# Patient Record
Sex: FEMALE | Race: BLACK OR AFRICAN AMERICAN | NOT HISPANIC OR LATINO | Employment: STUDENT | URBAN - METROPOLITAN AREA
[De-identification: names, ages, dates, MRNs, and addresses within clinical notes are randomized per-mention and may not be internally consistent; named-entity substitution may affect disease eponyms.]

---

## 2018-08-29 ENCOUNTER — HOSPITAL ENCOUNTER (OUTPATIENT)
Dept: MEDSURG UNIT | Facility: HOSPITAL | Age: 18
End: 2018-08-30
Attending: INTERNAL MEDICINE | Admitting: PHYSICIAN ASSISTANT

## 2018-08-29 LAB
ABS NEUT (OLG): 4.65 X10(3)/MCL
ALBUMIN SERPL-MCNC: 3.7 GM/DL (ref 3.4–5)
ALBUMIN/GLOB SERPL: 1 RATIO (ref 1–2)
ALP SERPL-CCNC: 80 UNIT/L (ref 45–117)
ALT SERPL-CCNC: 16 UNIT/L (ref 12–78)
ANISOCYTOSIS BLD QL SMEAR: NORMAL
APPEARANCE, UA: CLEAR
AST SERPL-CCNC: 13 UNIT/L (ref 15–37)
BACTERIA #/AREA URNS AUTO: ABNORMAL /[HPF]
BASOPHILS # BLD AUTO: 0.03 X10(3)/MCL
BASOPHILS NFR BLD AUTO: 0 %
BILIRUB SERPL-MCNC: 0.2 MG/DL (ref 0.2–1)
BILIRUB UR QL STRIP: NEGATIVE
BILIRUBIN DIRECT+TOT PNL SERPL-MCNC: <0.1 MG/DL
BILIRUBIN DIRECT+TOT PNL SERPL-MCNC: ABNORMAL MG/DL
BUN SERPL-MCNC: 5 MG/DL (ref 7–18)
CALCIUM SERPL-MCNC: 8.6 MG/DL (ref 8.5–10.1)
CHLORIDE SERPL-SCNC: 106 MMOL/L (ref 98–107)
CO2 SERPL-SCNC: 29 MMOL/L (ref 21–32)
COLOR UR: NORMAL
CREAT SERPL-MCNC: 0.9 MG/DL (ref 0.6–1.3)
CROSSMATCH INTERPRETATION: NORMAL
EOSINOPHIL # BLD AUTO: 0.05 X10(3)/MCL
EOSINOPHIL NFR BLD AUTO: 1 %
ERYTHROCYTE [DISTWIDTH] IN BLOOD BY AUTOMATED COUNT: 16.8 % (ref 11.5–14.5)
GLOBULIN SER-MCNC: 3.8 GM/ML (ref 2.3–3.5)
GLUCOSE (UA): NORMAL
GLUCOSE SERPL-MCNC: 88 MG/DL (ref 74–106)
HCT VFR BLD AUTO: 23.4 % (ref 35–46)
HGB BLD-MCNC: 6.6 GM/DL (ref 12–16)
HGB UR QL STRIP: NEGATIVE
HYALINE CASTS #/AREA URNS LPF: ABNORMAL /[LPF]
HYPOCHROMIA BLD QL SMEAR: NORMAL
IMM GRANULOCYTES # BLD AUTO: 0.04 10*3/UL
IMM GRANULOCYTES NFR BLD AUTO: 0 %
KETONES UR QL STRIP: NEGATIVE
LEUKOCYTE ESTERASE UR QL STRIP: NEGATIVE
LYMPHOCYTES # BLD AUTO: 2.13 X10(3)/MCL
LYMPHOCYTES NFR BLD AUTO: 27 % (ref 13–40)
MACROCYTES BLD QL SMEAR: NORMAL
MCH RBC QN AUTO: 21.8 PG (ref 26–34)
MCHC RBC AUTO-ENTMCNC: 28.2 GM/DL (ref 31–37)
MCV RBC AUTO: 77.2 FL (ref 80–100)
MONOCYTES # BLD AUTO: 1.04 X10(3)/MCL
MONOCYTES NFR BLD AUTO: 13 % (ref 4–12)
NEUTROPHILS # BLD AUTO: 4.65 X10(3)/MCL
NEUTROPHILS NFR BLD AUTO: 59 %
NITRITE UR QL STRIP: NEGATIVE
OVALOCYTES BLD QL SMEAR: NORMAL
PH UR STRIP: 8 [PH] (ref 4.5–8)
PLATELET # BLD AUTO: 337 X10(3)/MCL (ref 130–400)
PLATELET # BLD EST: ADEQUATE 10*3/UL
PMV BLD AUTO: 9.5 FL (ref 7.4–10.4)
POC BETA-HCG (QUAL): NEGATIVE
POIKILOCYTOSIS BLD QL SMEAR: NORMAL
POTASSIUM SERPL-SCNC: 3.7 MMOL/L (ref 3.5–5.1)
PRODUCT READY: NORMAL
PROT SERPL-MCNC: 7.5 GM/DL (ref 6.4–8.2)
PROT UR QL STRIP: NEGATIVE
RBC # BLD AUTO: 3.03 X10(6)/MCL (ref 4–5.2)
RBC #/AREA URNS AUTO: ABNORMAL /[HPF]
RBC MORPH BLD: NORMAL
SCHISTOCYTES BLD QL AUTO: NORMAL
SODIUM SERPL-SCNC: 139 MMOL/L (ref 136–145)
SP GR UR STRIP: 1.01 (ref 1–1.03)
SQUAMOUS #/AREA URNS LPF: ABNORMAL /[LPF]
TRANSFUSION ORDER: NORMAL
UROBILINOGEN UR STRIP-ACNC: NORMAL
WBC # SPEC AUTO: 7.9 X10(3)/MCL (ref 4.5–11)
WBC #/AREA URNS AUTO: ABNORMAL /HPF

## 2018-08-30 LAB
ABS NEUT (OLG): 2.76 X10(3)/MCL (ref 2.1–9.2)
BASOPHILS # BLD AUTO: 0.06 X10(3)/MCL
BASOPHILS NFR BLD AUTO: 1 %
BUN SERPL-MCNC: 5 MG/DL (ref 7–18)
CALCIUM SERPL-MCNC: 8.5 MG/DL (ref 8.5–10.1)
CHLORIDE SERPL-SCNC: 107 MMOL/L (ref 98–107)
CO2 SERPL-SCNC: 27 MMOL/L (ref 21–32)
CREAT SERPL-MCNC: 0.7 MG/DL (ref 0.6–1.3)
CREAT/UREA NIT SERPL: 7
EOSINOPHIL # BLD AUTO: 0.11 X10(3)/MCL
EOSINOPHIL NFR BLD AUTO: 2 %
ERYTHROCYTE [DISTWIDTH] IN BLOOD BY AUTOMATED COUNT: 17.3 % (ref 11.5–14.5)
GLUCOSE SERPL-MCNC: 88 MG/DL (ref 74–106)
HCT VFR BLD AUTO: 30.4 % (ref 35–46)
HGB BLD-MCNC: 9.1 GM/DL (ref 12–16)
IMM GRANULOCYTES # BLD AUTO: 0.01 10*3/UL
IMM GRANULOCYTES NFR BLD AUTO: 0 %
INR PPP: 1.21 (ref 0.9–1.2)
IRON SATN MFR SERPL: 3.9 % (ref 15–50)
IRON SERPL-MCNC: 18 MCG/DL (ref 50–170)
LYMPHOCYTES # BLD AUTO: 3.11 X10(3)/MCL
LYMPHOCYTES NFR BLD AUTO: 45 % (ref 13–40)
MCH RBC QN AUTO: 23.8 PG (ref 26–34)
MCHC RBC AUTO-ENTMCNC: 29.9 GM/DL (ref 31–37)
MCV RBC AUTO: 79.4 FL (ref 80–100)
MONOCYTES # BLD AUTO: 0.83 X10(3)/MCL
MONOCYTES NFR BLD AUTO: 12 % (ref 4–12)
NEUTROPHILS # BLD AUTO: 2.76 X10(3)/MCL
NEUTROPHILS NFR BLD AUTO: 40 X10(3)/MCL
PLATELET # BLD AUTO: 317 X10(3)/MCL (ref 130–400)
PMV BLD AUTO: 10.3 FL (ref 7.4–10.4)
POTASSIUM SERPL-SCNC: 4.1 MMOL/L (ref 3.5–5.1)
PROTHROMBIN TIME: 14.5 SECOND(S) (ref 11.9–14.4)
RBC # BLD AUTO: 3.83 X10(6)/MCL (ref 4–5.2)
RET# (OHS): 0.1 X10(6)/MCL (ref 0.02–0.08)
RETICULOCYTE COUNT AUTOMATED (OLG): 2.6 % (ref 0.5–1.5)
SODIUM SERPL-SCNC: 139 MMOL/L (ref 136–145)
TIBC SERPL-MCNC: 463 MCG/DL (ref 250–450)
TRANSFERRIN SERPL-MCNC: 382 MG/DL (ref 200–360)
WBC # SPEC AUTO: 6.9 X10(3)/MCL (ref 4.5–11)

## 2019-02-06 ENCOUNTER — HISTORICAL (OUTPATIENT)
Dept: ADMINISTRATIVE | Facility: HOSPITAL | Age: 19
End: 2019-02-06

## 2019-02-08 LAB — FINAL CULTURE: NORMAL

## 2021-03-11 ENCOUNTER — HISTORICAL (OUTPATIENT)
Dept: ADMINISTRATIVE | Facility: HOSPITAL | Age: 21
End: 2021-03-11

## 2021-03-11 LAB — POC BETA-HCG (QUAL): NEGATIVE

## 2021-05-29 LAB
BILIRUB SERPL-MCNC: NEGATIVE MG/DL
BLOOD URINE, POC: NEGATIVE
CLARITY, POC UA: NORMAL
COLOR, POC UA: YELLOW
GLUCOSE UR QL STRIP: NEGATIVE
KETONES UR QL STRIP: NEGATIVE
LEUKOCYTE EST, POC UA: NEGATIVE
NITRITE, POC UA: NEGATIVE
PH, POC UA: 6
POC BETA-HCG (QUAL): NEGATIVE
PROTEIN, POC: NORMAL
SPECIFIC GRAVITY, POC UA: NORMAL
UROBILINOGEN, POC UA: NORMAL

## 2021-07-05 ENCOUNTER — HISTORICAL (OUTPATIENT)
Dept: ADMINISTRATIVE | Facility: HOSPITAL | Age: 21
End: 2021-07-05

## 2021-07-05 LAB — SARS-COV-2 RNA RESP QL NAA+PROBE: NEGATIVE

## 2021-07-18 ENCOUNTER — HISTORICAL (OUTPATIENT)
Dept: ADMINISTRATIVE | Facility: HOSPITAL | Age: 21
End: 2021-07-18

## 2021-07-18 LAB — SARS-COV-2 RNA RESP QL NAA+PROBE: NOT DETECTED

## 2022-03-22 LAB
PAP RECOMMENDATION EXT: NORMAL
PAP SMEAR: NORMAL

## 2022-04-30 NOTE — H&P
Patient:   White, Ela             MRN: [id number]            FIN: [id number]               Age:   18 years     Sex:  Female     :  2000   Associated Diagnoses:   None   Author:   Stu oDherty DO      Chief Complaint   2018 14:35 CDT      Pt c/o chest tightness with Sob since Friday. Hx of endmetriosis with heavy vaginal bleeding that stopped 10 days ago. Had blood work done yesterday and was told her hemoglobin was 6.9. aaox3,nad.        History of Present Illness   18 F presents after labs at  Clinic showed severe anemia, she was referred to [facility] ED for transfusion.  She played softball the weekend and started becoming short of breath with associated chest tightness, resolved after rest.  She admits recent fatigue and headache. She has a history of asthma, tried her rescue inhaler without benefit, as well as known dysmenometorrhagia for which she is on Provera managed by Gyn. Labs in ED confirm low H&H, EKG NSR w/o ST or interval changes.      Review of Systems   Constitutional: no fevers, night sweats, chills or fatigue  HEENT: no acute vision changes  CVS: no chest pain, palpitations  Resp: + SOB, no cough or wheezing  GI: no abd pain, nausea, vomiting or diarrhea  : no dysuria, urinary incontinence  Musculoskeletal: no joint stiffness, pain, swelling or weakness  Skin: no rashes, lesions  Neuro: + headaches, seizures, numbness or syncope  Psych: no depression or anxiety          Physical Examination      Vital Signs (last 24 hrs)_____  Last Charted___________  Temp Oral     37 DegC  (AUG 29 16:)  Heart Rate Peripheral   84 bpm  (AUG 29 16:)  Resp Rate         20 br/min  (AUG 29 16:)  SBP      106 mmHg  (AUG 29 16:)  DBP      68 mmHg  (AUG 29 16:)  SpO2      100 %  (AUG 29 16:)  Weight      71 kg  (AUG 29 14:35)  Height      170 cm  (AUG 29 14:35)  BMI      24.57  (AUG 29 14:)     General: AAOx3, NAD, alert and cooperative  HEENT: PERRLA, EOMI,+  pale conjunctiva  Neck:  no LAD, no JVD, supple, no masses or thyromegaly  CVS: S1/S2 nml, RRR, no murmurs, rubs or gallops, no carotid bruits  Resp: CTA B/L, no rhonchi, rales, or wheezing  GI: Not distended, not tender, BS+, no guarding  : no CVA tenderness, normal external genitalia  Skin: not jaundiced, warm, no rashes  Musculoskeletal: normal ROM, no joint tenderness, normal muscular development  Extremities: no peripheral edema, peripheral pulses intact  Neuro: CN II-XII grossly intact, strength and sensation symmetric and intact throughout, no focal neurological deficits       Health Maintenance      Review / Management        Results review:  All Results   8/29/2018 14:55 CDT      WBC                       7.9 x10(3)/mcL                             RBC                       3.03 x10(6)/mcL  LOW                             Hgb                       6.6 gm/dL  CRIT                             Hct                       23.4 %  LOW                             Platelet                  337 x10(3)/mcL                             MCV                       77.2 fL  LOW                             MCH                       21.8 pg  LOW                             MCHC                      28.2 gm/dL  LOW                             RDW                       16.8 %  HI                             MPV                       9.5 fL                             Abs Neut                  4.65 x10(3)/mcL                             Neutro Auto               59  NA                             Lymph Auto                27 %                             Mono Auto                 13 %  HI                             Eos Auto                  1  NA                             Abs Eos                   0.05 x10(3)/mcL  NA                             Basophil Auto             0  NA                             Abs Neutro                4.65 x10(3)/mcL  NA                             Abs Lymph                 2.13 x10(3)/mcL  NA                              Abs Mono                  1.04 x10(3)/mcL  NA                             Abs Baso                  0.03 x10(3)/mcL  NA                             IG%                       0 %  NA                             IG#                       0.0400  NA                             Hypochrom                 1+                             Platelet Est              Adequate                             Anisocyte                 1+                             Poik                      2+                             Macrocyte                 1+                             RBC Morph                 Abnormal                             Schistocyte               1+                             Ovalocytes                2+                             Sodium Lvl                139 mmol/L                             Potassium Lvl             3.7 mmol/L                             Chloride                  106 mmol/L                             CO2                       29 mmol/L                             Calcium Lvl               8.6 mg/dL                             Glucose Lvl               88 mg/dL                             BUN                       5 mg/dL  LOW                             Creatinine                0.90 mg/dL                             eGFR-AA                   103 mL/min                             eGFR-AIDE                  85 mL/min  LOW                             Bili Total                0.2 mg/dL                             Bili Direct               <0.1 mg/dL                             Bili Indirect             CALCNOT VALID mg/dL                             AST                       13 unit/L  LOW                             ALT                       16 unit/L                             Alk Phos                  80 unit/L                             Total Protein             7.5 gm/dL                             Albumin Lvl               3.7 gm/dL                             Globulin                   3.80 gm/mL  HI                             A/G Ratio                 1 ratio  .       Impression and Plan   Anemia, suspect iron deficiency secondary to menstrual blood loss  History of asthma  History of endometrial    Admitted to medical unit for transfusion, PRBCs ×2 planned, obtain anemia labs, start IV and oral iron formulations. Plan for DC in AM if CBC stable.

## 2022-04-30 NOTE — ED PROVIDER NOTES
Patient:   White, Ela             MRN: [id number]            FIN: [id number]               Age:   18 years     Sex:  Female     :  2000   Associated Diagnoses:   Symptomatic anemia   Author:   Jorge Hansen MD      Basic Information   Time seen: Date 2018, Immediately upon arrival.   History source: Patient.   Arrival mode: Private vehicle.   History limitation: None.   Additional information: Chief Complaint from Nursing Triage Note : Chief Complaint   2018 14:35 CDT      Chief Complaint           Pt c/o chest tightness with Sob since Friday. Hx of endmetriosis with heavy vaginal bleeding that stopped 10 days ago. Had blood work done yesterday and was told her hemoglobin was 6.9. aaox3,nad.  .      History of Present Illness   The patient presents with abnormal lab test.  Lab test value Hemoglobin 6.9.  Associated symptoms: chest pain and shortness of breath.  Risk factors consist of none.  Prior episodes: none.  Therapy today: see nurses notes.        Review of Systems   Constitutional symptoms:  No fever, no chills, no sweats, no weakness, no fatigue, no decreased activity.    Skin symptoms:  No rash,    Respiratory symptoms:  Shortness of breath, no cough, no hemoptysis, no sputum production.    Cardiovascular symptoms:  Chest pain, no palpitations, no tachycardia, no syncope, no diaphoresis, no peripheral edema.    Gastrointestinal symptoms:  No abdominal pain, no nausea, no vomiting, no diarrhea, no constipation, no rectal bleeding.    Genitourinary symptoms:  No dysuria, no hematuria, no vaginal bleeding, no vaginal discharge.    Musculoskeletal symptoms:  No back pain, no Muscle pain, no Joint pain.    Neurologic symptoms:  No headache, no dizziness, no altered level of consciousness, no numbness, no tingling, no weakness.              Additional review of systems information: All systems reviewed as documented in chart.      Health Status   Allergies:    Allergic Reactions  (Selected)  Severity Not Documented  Adhesive Bandage- Rash.  Egg- No reactions were documented.  Iodine- No reactions were documented.,    Allergies (3) Active Reaction  Adhesive Bandage rash  Egg None Documented  iodine None Documented  .   Medications:  (Selected)   Inpatient Medications  Future  Depo-Provera 150 mg/mL intramuscular suspension: 150 mg, form: Injection, IM, Once, first dose 03/14/18 11:00:00 CDT, stop date 03/14/18 11:00:00 CDT, Months +9, Future Order, 24  Depo-Provera 150 mg/mL intramuscular suspension: 150 mg, form: Injection, IM, Once, first dose 12/14/17 11:00:00 CST, stop date 12/14/17 11:00:00 CST, Months +6, Future Order, 24  Prescriptions  Prescribed  ferrous sulfate 325 mg (65 mg elemental iron) oral delayed release tablet: 325 mg = 1 tab(s), Oral, Daily, # 90 tab(s), 1 Refill(s), Pharmacy: [facility]/pharmacy #[id number].      Past Medical/ Family/ Social History   Medical history:    Active  Endometriosis ([id number]), Reviewed as documented in chart.   Surgical history:    Dilatation and curettage ([id number]).  EXPLORE. LAP.., Reviewed as documented in chart.   Family history:    Entire family history is negative., Reviewed as documented in chart.   Problem list:    Active Problems (4)  Anemia   Endometriosis   Migraine   Tobacco user   .      Physical Examination               Vital Signs   Vital Signs   8/29/2018 14:35 CDT      Temperature Oral          36.5 DegC                             Temperature Oral (calculated)             97.70 DegF                             Peripheral Pulse Rate     80 bpm                             Respiratory Rate          18 br/min                             SpO2                      100 %                             Oxygen Therapy            Room air                             Systolic Blood Pressure   105 mmHg                             Diastolic Blood Pressure  66 mmHg  .   General:  Alert, no acute distress.    Skin:  Intact, normal for ethnicity.     Head:  Normocephalic.   Neck:  Supple.   Eye:  pale conjunctive bilateral.   Ears, nose, mouth and throat:  Oral mucosa moist.   Cardiovascular:  Regular rate and rhythm, Normal peripheral perfusion.    Respiratory:  Lungs are clear to auscultation, respirations are non-labored, breath sounds are equal.    Gastrointestinal:  Soft, Nontender, Non distended, Normal bowel sounds.    Neurological:  Alert and oriented to person, place, time, and situation, No focal neurological deficit observed.    Psychiatric:  Cooperative.      Medical Decision Making   Documents reviewed:  Emergency department nurses' notes, emergency department records, prior records.    Electrocardiogram:  Time 8/29/2018 14:42:00, rate 75, normal sinus rhythm, No ST-T changes, no ectopy, normal RI & QRS intervals, EP Interp.    Results review:  Lab results : Lab View   8/29/2018 14:55 CDT      Sodium Lvl                139 mmol/L                             Potassium Lvl             3.7 mmol/L                             Chloride                  106 mmol/L                             CO2                       29 mmol/L                             Calcium Lvl               8.6 mg/dL                             Glucose Lvl               88 mg/dL                             BUN                       5 mg/dL  LOW                             Creatinine                0.90 mg/dL                             eGFR-AA                   103 mL/min                             eGFR-AIDE                  85 mL/min  LOW                             Bili Total                0.2 mg/dL                             Bili Direct               <0.1 mg/dL                             Bili Indirect             CALCNOT VALID mg/dL                             AST                       13 unit/L  LOW                             ALT                       16 unit/L                             Alk Phos                  80 unit/L                             Total Protein              7.5 gm/dL                             Albumin Lvl               3.7 gm/dL                             Globulin                  3.80 gm/mL  HI                             A/G Ratio                 1 ratio                             WBC                       7.9 x10(3)/mcL                             RBC                       3.03 x10(6)/mcL  LOW                             Hgb                       6.6 gm/dL  CRIT                             Hct                       23.4 %  LOW                             Platelet                  337 x10(3)/mcL                             MCV                       77.2 fL  LOW                             MCH                       21.8 pg  LOW                             MCHC                      28.2 gm/dL  LOW                             RDW                       16.8 %  HI                             MPV                       9.5 fL                             Abs Neut                  4.65 x10(3)/mcL                             Neutro Auto               59  NA                             Lymph Auto                27 %                             Mono Auto                 13 %  HI                             Eos Auto                  1  NA                             Abs Eos                   0.05 x10(3)/mcL  NA                             Basophil Auto             0  NA                             Abs Neutro                4.65 x10(3)/mcL  NA                             Abs Lymph                 2.13 x10(3)/mcL  NA                             Abs Mono                  1.04 x10(3)/mcL  NA                             Abs Baso                  0.03 x10(3)/mcL  NA                             IG%                       0 %  NA                             IG#                       0.0400  NA  .   Radiology results:  Rad Results (ST)  < 12 hrs   Accession: [id number]  Order: XR Chest 2 Views  Report Dt/Tm: 08/29/2018 16:00  Report:      History:  Chest pain     Reference:  23  January 2018     Findings:  Frontal and lateral views of the chest were obtained. Heart and  mediastinum within normal limits. The lungs are clear. No pneumothorax  or significant effusion.     Impression:   No acute cardiopulmonary abnormality.      .      Reexamination/ Reevaluation   Time: 8/29/2018 15:30:00 .   Vital signs   results included from flowsheet : Vital Signs   8/29/2018 14:35 CDT      Temperature Oral          36.5 DegC                             Temperature Oral (calculated)             97.70 DegF                             Peripheral Pulse Rate     80 bpm                             Respiratory Rate          18 br/min                             SpO2                      100 %                             Oxygen Therapy            Room air                             Systolic Blood Pressure   105 mmHg                             Diastolic Blood Pressure  66 mmHg     Course: progressing as expected.   Assessment: Pt with critical H/H-transfusion ordered, consult medicine for admission..      Procedure   Critical care note   Total time: 30 minutes spent engaged in work directly related to patient care and/ or available for direct patient care.   Critical condition(s) addressed for impending deterioration include: respiratory, cardiovascular.   Associated risk factors: bleeding.   Management: bedside assessment, supervision of care, Interpretation (chest x-ray, electrocardiogram, blood pressure, cardiac output measures).   Performed by: self.      Impression and Plan   Diagnosis   Symptomatic anemia ([id number] D64.9)      Calls-Consults   -  8/29/2018 15:34:00 , Kathleen Ventura MD, Internal Medicine, recommends admission.    Plan   Condition: Stable.    Disposition: Admit time  8/29/2018 16:05:00, Place in Observation Telemetry Unit.    Counseled: Patient, Regarding diagnosis, Regarding diagnostic results, Regarding treatment plan, Patient indicated understanding of instructions.
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
## 2022-07-30 ENCOUNTER — HOSPITAL ENCOUNTER (EMERGENCY)
Facility: HOSPITAL | Age: 22
Discharge: HOME | End: 2022-07-31
Attending: EMERGENCY MEDICINE
Payer: MEDICAID

## 2022-07-30 DIAGNOSIS — F41.0 ANXIETY ATTACK: Primary | ICD-10-CM

## 2022-07-30 PROCEDURE — 81001 URINALYSIS AUTO W/SCOPE: CPT | Performed by: EMERGENCY MEDICINE

## 2022-07-30 PROCEDURE — 81025 URINE PREGNANCY TEST: CPT | Performed by: EMERGENCY MEDICINE

## 2022-07-30 PROCEDURE — 80307 DRUG TEST PRSMV CHEM ANLYZR: CPT | Performed by: EMERGENCY MEDICINE

## 2022-07-30 PROCEDURE — 99282 EMERGENCY DEPT VISIT SF MDM: CPT

## 2022-07-31 VITALS
WEIGHT: 192.88 LBS | SYSTOLIC BLOOD PRESSURE: 113 MMHG | RESPIRATION RATE: 20 BRPM | HEART RATE: 84 BPM | OXYGEN SATURATION: 98 % | TEMPERATURE: 98 F | BODY MASS INDEX: 30.27 KG/M2 | HEIGHT: 67 IN | DIASTOLIC BLOOD PRESSURE: 75 MMHG

## 2022-07-31 LAB
AMPHET UR QL SCN: NEGATIVE
APPEARANCE UR: CLEAR
B-HCG SERPL QL: NEGATIVE
BACTERIA #/AREA URNS AUTO: NORMAL /HPF
BARBITURATE SCN PRESENT UR: NEGATIVE
BENZODIAZ UR QL SCN: NEGATIVE
BILIRUB UR QL STRIP.AUTO: NEGATIVE MG/DL
CANNABINOIDS UR QL SCN: NEGATIVE
COCAINE UR QL SCN: NEGATIVE
COLOR UR AUTO: YELLOW
FENTANYL UR QL SCN: NEGATIVE
GLUCOSE UR QL STRIP.AUTO: NEGATIVE MG/DL
KETONES UR QL STRIP.AUTO: NEGATIVE MG/DL
LEUKOCYTE ESTERASE UR QL STRIP.AUTO: NEGATIVE UNIT/L
MDMA UR QL SCN: NEGATIVE
NITRITE UR QL STRIP.AUTO: NEGATIVE
OPIATES UR QL SCN: NEGATIVE
PCP UR QL: NEGATIVE
PH UR STRIP.AUTO: 5.5 [PH]
PH UR: 5.5 [PH] (ref 3–11)
PROT UR QL STRIP.AUTO: NEGATIVE MG/DL
RBC #/AREA URNS AUTO: <5 /HPF
RBC UR QL AUTO: NEGATIVE UNIT/L
SP GR UR STRIP.AUTO: 1.02 (ref 1–1.03)
SPECIFIC GRAVITY, URINE AUTO (.000) (OHS): 1.02 (ref 1–1.03)
SQUAMOUS #/AREA URNS AUTO: <5 /HPF
UROBILINOGEN UR STRIP-ACNC: 0.2 MG/DL
WBC #/AREA URNS AUTO: <5 /HPF

## 2022-07-31 NOTE — ED PROVIDER NOTES
"Encounter Date: 7/30/2022    SCRIBE #1 NOTE: I, Antoinette Fracisco, am scribing for, and in the presence of,  Jhony Dahl MD. I have scribed the following portions of the note - Other sections scribed: HPI, ROS, PE.       History     Chief Complaint   Patient presents with    Drug / Alcohol Assessment     Complaint of smoking marijuana at 2100 this evening.  Feeling bad, like " I'm going to die".         21 y/o female, with a history of asthma, presents to the ED for feelings of anxiousness beginning about 1 hour after smoking marihuana around 21:00. Pt states she feels much better now. She mentions similar feelings of anxiousness with the last time she smoked marijuana about 1 year ago. She denies any hallucinations.     The history is provided by the patient. No  was used.   Drug / Alcohol Assessment  Primary symptoms include no weakness. Primary symptoms comment: anxiety. This is a recurrent problem. The current episode started 3 to 5 hours ago. The problem has been resolved. Suspected agents include marajuana. Pertinent negatives include no fever, no nausea and no vomiting.     Review of patient's allergies indicates:   Allergen Reactions    Shellfish containing products Swelling    Egg     Adhesive Rash    Iodine Rash     No past medical history on file.  No past surgical history on file.  No family history on file.     Review of Systems   Constitutional: Negative for activity change, chills, diaphoresis, fatigue and fever.   HENT: Negative for congestion, ear pain, sinus pain and sore throat.    Eyes: Negative for visual disturbance.   Respiratory: Negative for cough, shortness of breath, wheezing and stridor.    Cardiovascular: Negative for chest pain, palpitations and leg swelling.   Gastrointestinal: Negative for abdominal pain, constipation, diarrhea, nausea, rectal pain and vomiting.   Genitourinary: Negative for dysuria and hematuria.   Musculoskeletal: Negative for " arthralgias, back pain and myalgias.   Skin: Negative for rash.   Neurological: Negative for dizziness, syncope, weakness, numbness and headaches.   Psychiatric/Behavioral: The patient is nervous/anxious.    All other systems reviewed and are negative.      Physical Exam     Initial Vitals [07/30/22 2259]   BP Pulse Resp Temp SpO2   124/79 (!) 116 20 97.7 °F (36.5 °C) 96 %      MAP       --         Physical Exam    Nursing note and vitals reviewed.  Constitutional: No distress.   HENT:   Head: Normocephalic and atraumatic.   Eyes: EOM are normal.   Neck: Trachea normal. Neck supple.   Normal range of motion.  Cardiovascular: Normal rate and regular rhythm.   No murmur heard.  Pulmonary/Chest: Breath sounds normal. No respiratory distress. She has no wheezes. She has no rhonchi. She has no rales.   Abdominal: Abdomen is soft. Bowel sounds are normal. She exhibits no distension. There is no abdominal tenderness. There is no rebound and no guarding.   Musculoskeletal:         General: Normal range of motion.      Cervical back: Normal range of motion and neck supple.      Lumbar back: Normal range of motion.     Neurological: She is alert and oriented to person, place, and time. She has normal strength.   Skin: Skin is warm and dry. No rash noted.   Psychiatric: She has a normal mood and affect. Her behavior is normal. Judgment and thought content normal.         ED Course   Procedures  Labs Reviewed   HCG QUALITATIVE URINE - Normal   URINALYSIS - Normal   URINALYSIS, MICROSCOPIC - Normal   DRUG SCREEN, URINE (BEAKER)          Imaging Results    None          Medications - No data to display  Medical Decision Making:   Clinical Tests:   Lab Tests: Ordered and Reviewed          Scribe Attestation:   Scribe #1: I performed the above scribed service and the documentation accurately describes the services I performed. I attest to the accuracy of the note.    Attending Attestation:           Physician Attestation for  Scribe:  Physician Attestation Statement for Scribe #1: I, Jhony Dahl MD, reviewed documentation, as scribed by Antoinette Yap in my presence, and it is both accurate and complete.             ED Course as of 07/31/22 0249   Sun Jul 31, 2022 0246 Patient is calm, answers all questions appropriately.  States she is feeling much better.  Will DC to home. [KG]      ED Course User Index  [KG] Jhony Dahl MD             Clinical Impression:   Final diagnoses:  [F41.0] Anxiety attack (Primary)          ED Disposition Condition    Discharge Stable        ED Prescriptions     None        Follow-up Information     Follow up With Specialties Details Why Contact Info    [facility] - Emergency Dept Emergency Medicine  If symptoms worsen [address] [facility] [id number] [phone]           Jhony Dahl MD  07/31/22 0249

## 2022-09-17 ENCOUNTER — HISTORICAL (OUTPATIENT)
Dept: ADMINISTRATIVE | Facility: HOSPITAL | Age: 22
End: 2022-09-17
Payer: MEDICAID

## 2023-06-13 ENCOUNTER — PATIENT MESSAGE (OUTPATIENT)
Dept: RESEARCH | Facility: HOSPITAL | Age: 23
End: 2023-06-13
Payer: OTHER GOVERNMENT

## 2023-11-02 ENCOUNTER — HOSPITAL ENCOUNTER (EMERGENCY)
Facility: HOSPITAL | Age: 23
Discharge: HOME | End: 2023-11-02
Attending: INTERNAL MEDICINE
Payer: MEDICAID

## 2023-11-02 VITALS
HEART RATE: 91 BPM | SYSTOLIC BLOOD PRESSURE: 123 MMHG | BODY MASS INDEX: 35.29 KG/M2 | TEMPERATURE: 98 F | WEIGHT: 224.88 LBS | OXYGEN SATURATION: 98 % | DIASTOLIC BLOOD PRESSURE: 89 MMHG | HEIGHT: 67 IN | RESPIRATION RATE: 16 BRPM

## 2023-11-02 DIAGNOSIS — R53.1 GENERALIZED WEAKNESS: Primary | ICD-10-CM

## 2023-11-02 LAB
ALBUMIN SERPL-MCNC: 3.8 G/DL (ref 3.5–5)
ALBUMIN/GLOB SERPL: 1.1 RATIO (ref 1.1–2)
ALP SERPL-CCNC: 75 UNIT/L (ref 40–150)
ALT SERPL-CCNC: 18 UNIT/L (ref 0–55)
APPEARANCE UR: ABNORMAL
AST SERPL-CCNC: 16 UNIT/L (ref 5–34)
B-HCG UR QL: NEGATIVE
BACTERIA #/AREA URNS AUTO: ABNORMAL /HPF
BASOPHILS # BLD AUTO: 0.04 X10(3)/MCL
BASOPHILS NFR BLD AUTO: 0.5 %
BILIRUB SERPL-MCNC: 0.2 MG/DL
BILIRUB UR QL STRIP.AUTO: NEGATIVE
BUN SERPL-MCNC: 8.2 MG/DL (ref 7–18.7)
CALCIUM SERPL-MCNC: 9.2 MG/DL (ref 8.4–10.2)
CHLORIDE SERPL-SCNC: 107 MMOL/L (ref 98–107)
CO2 SERPL-SCNC: 25 MMOL/L (ref 22–29)
COLOR UR AUTO: YELLOW
CREAT SERPL-MCNC: 0.85 MG/DL (ref 0.55–1.02)
CTP QC/QA: YES
EOSINOPHIL # BLD AUTO: 0.08 X10(3)/MCL (ref 0–0.9)
EOSINOPHIL NFR BLD AUTO: 1 %
ERYTHROCYTE [DISTWIDTH] IN BLOOD BY AUTOMATED COUNT: 12.1 % (ref 11.5–17)
GFR SERPLBLD CREATININE-BSD FMLA CKD-EPI: >60 MLS/MIN/1.73/M2
GLOBULIN SER-MCNC: 3.6 GM/DL (ref 2.4–3.5)
GLUCOSE SERPL-MCNC: 88 MG/DL (ref 74–100)
GLUCOSE UR QL STRIP.AUTO: NORMAL
HCT VFR BLD AUTO: 40.1 % (ref 37–47)
HGB BLD-MCNC: 13.4 G/DL (ref 12–16)
HYALINE CASTS #/AREA URNS LPF: ABNORMAL /LPF
IMM GRANULOCYTES # BLD AUTO: 0.03 X10(3)/MCL (ref 0–0.04)
IMM GRANULOCYTES NFR BLD AUTO: 0.4 %
KETONES UR QL STRIP.AUTO: NEGATIVE
LEUKOCYTE ESTERASE UR QL STRIP.AUTO: NEGATIVE
LYMPHOCYTES # BLD AUTO: 2.5 X10(3)/MCL (ref 0.6–4.6)
LYMPHOCYTES NFR BLD AUTO: 32.1 %
MCH RBC QN AUTO: 30.4 PG (ref 27–31)
MCHC RBC AUTO-ENTMCNC: 33.4 G/DL (ref 33–36)
MCV RBC AUTO: 90.9 FL (ref 80–94)
MONOCYTES # BLD AUTO: 0.64 X10(3)/MCL (ref 0.1–1.3)
MONOCYTES NFR BLD AUTO: 8.2 %
MUCOUS THREADS URNS QL MICRO: ABNORMAL /LPF
NEUTROPHILS # BLD AUTO: 4.5 X10(3)/MCL (ref 2.1–9.2)
NEUTROPHILS NFR BLD AUTO: 57.8 %
NITRITE UR QL STRIP.AUTO: NEGATIVE
NRBC BLD AUTO-RTO: 0 %
PH UR STRIP.AUTO: 5.5 [PH]
PLATELET # BLD AUTO: 316 X10(3)/MCL (ref 130–400)
PMV BLD AUTO: 9.7 FL (ref 7.4–10.4)
POTASSIUM SERPL-SCNC: 3.8 MMOL/L (ref 3.5–5.1)
PROT SERPL-MCNC: 7.4 GM/DL (ref 6.4–8.3)
PROT UR QL STRIP.AUTO: ABNORMAL
RBC # BLD AUTO: 4.41 X10(6)/MCL (ref 4.2–5.4)
RBC #/AREA URNS AUTO: ABNORMAL /HPF
RBC UR QL AUTO: ABNORMAL
SODIUM SERPL-SCNC: 141 MMOL/L (ref 136–145)
SP GR UR STRIP.AUTO: 1.03 (ref 1–1.03)
SQUAMOUS #/AREA URNS LPF: ABNORMAL /HPF
TSH SERPL-ACNC: 0.77 UIU/ML (ref 0.35–4.94)
UROBILINOGEN UR STRIP-ACNC: NORMAL
WBC # SPEC AUTO: 7.79 X10(3)/MCL (ref 4.5–11.5)
WBC #/AREA URNS AUTO: ABNORMAL /HPF

## 2023-11-02 PROCEDURE — 99283 EMERGENCY DEPT VISIT LOW MDM: CPT

## 2023-11-02 PROCEDURE — 81001 URINALYSIS AUTO W/SCOPE: CPT | Performed by: NURSE PRACTITIONER

## 2023-11-02 PROCEDURE — 85025 COMPLETE CBC W/AUTO DIFF WBC: CPT | Performed by: NURSE PRACTITIONER

## 2023-11-02 PROCEDURE — 84443 ASSAY THYROID STIM HORMONE: CPT | Performed by: INTERNAL MEDICINE

## 2023-11-02 PROCEDURE — 80053 COMPREHEN METABOLIC PANEL: CPT | Performed by: NURSE PRACTITIONER

## 2023-11-02 PROCEDURE — 81025 URINE PREGNANCY TEST: CPT | Performed by: NURSE PRACTITIONER

## 2023-11-02 NOTE — FIRST PROVIDER EVALUATION
Medical screening examination initiated.  I have conducted a focused provider triage encounter, findings are as follows:    Brief history of present illness:  Pt is a 23 y.o. female who presents with fatigue, headache. Reports frequent episodes of vaginal bleeding secondary to one of her home medications. Voicing concerns that she may be anemic.    There were no vitals filed for this visit.    Pertinent physical exam:      Brief workup plan:  Diagnostic evaluation    Preliminary workup initiated; this workup will be continued and followed by the physician or advanced practice provider that is assigned to the patient when roomed.

## 2023-11-02 NOTE — ED PROVIDER NOTES
Encounter Date: 11/2/2023       History     Chief Complaint   Patient presents with    weakness with fatigue (x)1 month     Pt states weakness with fatigue (x)1 month. Also reports history of endometriosis with bleeding that occurs every 2 weeks. Dane nadshaila     Patient is a 23-year-old female with history significant for controlled asthma, anxiety today with complaint of weakness and fatigue x1 month. Also reports history of endometriosis with bleeding that occurs every 2 weeks.  Reports heavy menstruation the last 4-6 days, 4-5 max patch changes daily. Patient follows with her gyn doctor every six-month.  History of blood transfusion x1 in 2018, received 2 units of pRBC. No PCP and desires to be established with one.  Denies shortness of breath, fever, chills, headache, presyncope, chest pain, palpitation, dysuria, urinary frequency, urgency, or retention.     The history is provided by the patient. No  was used.     Review of patient's allergies indicates:   Allergen Reactions    Shellfish containing products Swelling    Egg     Adhesive Rash    Iodine Rash     History reviewed. No pertinent past medical history.  History reviewed. No pertinent surgical history.  History reviewed. No pertinent family history.  Social History     Tobacco Use    Smoking status: Never     Passive exposure: Never    Smokeless tobacco: Never   Substance Use Topics    Drug use: Never     Review of Systems    Physical Exam     Initial Vitals [11/02/23 1527]   BP Pulse Resp Temp SpO2   122/80 94 18 98.4 °F (36.9 °C) 99 %      MAP       --         Physical Exam    Nursing note and vitals reviewed.  Constitutional: She appears well-developed and well-nourished.   HENT:   Head: Normocephalic and atraumatic.   Mouth/Throat: Oropharynx is clear and moist.   Eyes: Conjunctivae are normal. Pupils are equal, round, and reactive to light.   Neck: Neck supple. No thyromegaly present. No JVD present.   Normal range of  motion.  Cardiovascular:  Normal rate, regular rhythm, normal heart sounds and intact distal pulses.           Pulmonary/Chest: Breath sounds normal.   Abdominal: Abdomen is soft. Bowel sounds are normal. She exhibits no distension. There is no abdominal tenderness. There is no rebound and no guarding.   Musculoskeletal:         General: No edema. Normal range of motion.      Cervical back: Normal range of motion and neck supple.     Neurological: She is alert and oriented to person, place, and time. She has normal strength. GCS score is 15. GCS eye subscore is 4. GCS verbal subscore is 5. GCS motor subscore is 6.   Skin: Skin is warm and dry. No rash noted.   Psychiatric: Her behavior is normal.         ED Course   Procedures  Labs Reviewed   COMPREHENSIVE METABOLIC PANEL - Abnormal; Notable for the following components:       Result Value    Globulin 3.6 (*)     All other components within normal limits   URINALYSIS, REFLEX TO URINE CULTURE - Abnormal; Notable for the following components:    Appearance, UA Cloudy (*)     Protein, UA 1+ (*)     Blood, UA Trace (*)     WBC, UA 6-10 (*)     Bacteria, UA Trace (*)     Squamous Epithelial Cells, UA Many (*)     Mucous, UA Moderate (*)     All other components within normal limits   TSH - Normal   CBC W/ AUTO DIFFERENTIAL    Narrative:     The following orders were created for panel order CBC auto differential.  Procedure                               Abnormality         Status                     ---------                               -----------         ------                     CBC with Differential[[id number]]                            Final result                 Please view results for these tests on the individual orders.   CBC WITH DIFFERENTIAL   EXTRA TUBES    Narrative:     The following orders were created for panel order EXTRA TUBES.  Procedure                               Abnormality         Status                     ---------                                -----------         ------                     Light Blue Top Hold[[id number]]                              In process                 Gold Top Hold[[id number]]                                    In process                 Pink Top Hold[[id number]]                                    In process                   Please view results for these tests on the individual orders.   LIGHT BLUE TOP HOLD   GOLD TOP HOLD   PINK TOP HOLD   POCT URINE PREGNANCY          Imaging Results    None          Medications - No data to display  Medical Decision Making  Prior to reports to ED patient reports fatigue and dizziness.  That has not proved since admit to ED. H&H in ED unremarkable and within normal limits @ 14.4/31.4.  UA unrevealing, urine culture pending. CBC, CMP, and UPT all negative.  Vitals stable. patient clinically and hemodynamically stable for discharge.  ED precautions discussed with patient and patient verbalized understanding.  To return to ED with worsening symptoms.  Follow-up with gyn doctor in 1 week, follow-up with IM clinic in 1 week.  Questions asked and answered.    Amount and/or Complexity of Data Reviewed  Labs: ordered. Decision-making details documented in ED Course.      Additional MDM:   Differential Diagnosis:   Hypothyroidism, medication side effect, orthostatic weakness, kidney failure, stroke, among others              Attending Attestation:   Physician Attestation Statement for Resident:  As the supervising MD   Physician Attestation Statement: I have personally seen and examined this patient.   I agree with the above history.  -:   As the supervising MD I agree with the above PE.     As the supervising MD I agree with the above treatment, course, plan, and disposition.    I have reviewed and agree with the residents interpretation of the following: lab data.  I have reviewed the following: old records at this facility.                                Clinical Impression:   Final  diagnoses:  [R53.1] Generalized weakness (Primary)        ED Disposition Condition    Discharge Stable          ED Prescriptions    None       Follow-up Information       Follow up With Specialties Details Why Contact Info    [facility] - Internal Medicine Internal Medicine In 1 week If symptoms worsen [address] [facility] [address] [phone]    [facility] - Emergency Dept Emergency Medicine  If symptoms worsen [address] [facility] [address] [phone]        William Chopra MD, [facility] Resident, [facility]       Bari Chun MD  11/02/23 1709       Bari Chun MD  11/02/23 1710       Bari Chun MD  11/02/23 1711

## 2023-11-02 NOTE — Clinical Note
"Ela "Ela" Natalee was seen and treated in our emergency department on 11/2/2023.  She may return to work on 11/03/2023.       If you have any questions or concerns, please don't hesitate to call.       LPN    "

## 2023-11-28 ENCOUNTER — OFFICE VISIT (OUTPATIENT)
Dept: INTERNAL MEDICINE | Facility: CLINIC | Age: 23
End: 2023-11-28
Payer: OTHER GOVERNMENT

## 2023-11-28 VITALS
SYSTOLIC BLOOD PRESSURE: 108 MMHG | BODY MASS INDEX: 35.79 KG/M2 | DIASTOLIC BLOOD PRESSURE: 69 MMHG | RESPIRATION RATE: 16 BRPM | TEMPERATURE: 99 F | HEIGHT: 67 IN | WEIGHT: 228 LBS | HEART RATE: 82 BPM

## 2023-11-28 DIAGNOSIS — G43.909 MIGRAINE WITHOUT STATUS MIGRAINOSUS, NOT INTRACTABLE, UNSPECIFIED MIGRAINE TYPE: ICD-10-CM

## 2023-11-28 DIAGNOSIS — J45.909 ASTHMA, UNSPECIFIED ASTHMA SEVERITY, UNSPECIFIED WHETHER COMPLICATED, UNSPECIFIED WHETHER PERSISTENT: ICD-10-CM

## 2023-11-28 DIAGNOSIS — Z11.3 SCREEN FOR STD (SEXUALLY TRANSMITTED DISEASE): ICD-10-CM

## 2023-11-28 DIAGNOSIS — E04.9 THYROID ENLARGED: ICD-10-CM

## 2023-11-28 DIAGNOSIS — F17.290 OTHER TOBACCO PRODUCT NICOTINE DEPENDENCE, UNCOMPLICATED: ICD-10-CM

## 2023-11-28 DIAGNOSIS — Z00.00 WELLNESS EXAMINATION: ICD-10-CM

## 2023-11-28 PROCEDURE — 99406 BEHAV CHNG SMOKING 3-10 MIN: CPT | Mod: S$PBB,,, | Performed by: NURSE PRACTITIONER

## 2023-11-28 PROCEDURE — 99204 OFFICE O/P NEW MOD 45 MIN: CPT | Mod: S$PBB,25,, | Performed by: NURSE PRACTITIONER

## 2023-11-28 PROCEDURE — 99204 PR OFFICE/OUTPT VISIT, NEW, LEVL IV, 45-59 MIN: ICD-10-PCS | Mod: S$PBB,25,, | Performed by: NURSE PRACTITIONER

## 2023-11-28 PROCEDURE — 99215 OFFICE O/P EST HI 40 MIN: CPT | Mod: PBBFAC | Performed by: NURSE PRACTITIONER

## 2023-11-28 PROCEDURE — 99406 PR TOBACCO USE CESSATION INTERMEDIATE 3-10 MINUTES: ICD-10-PCS | Mod: S$PBB,,, | Performed by: NURSE PRACTITIONER

## 2023-11-28 RX ORDER — IBUPROFEN 800 MG/1
800 TABLET ORAL EVERY 6 HOURS PRN
COMMUNITY
Start: 2023-11-20

## 2023-11-28 RX ORDER — ELAGOLIX 150 MG/1
TABLET, FILM COATED ORAL
COMMUNITY
Start: 2023-11-20 | End: 2024-04-01

## 2023-11-28 RX ORDER — MONTELUKAST SODIUM 10 MG/1
TABLET ORAL
COMMUNITY
Start: 2022-04-18 | End: 2024-04-01 | Stop reason: SDUPTHER

## 2023-11-28 RX ORDER — AZELAIC ACID 0.15 G/G
GEL TOPICAL
COMMUNITY
Start: 2023-11-20

## 2023-11-28 RX ORDER — ALBUTEROL SULFATE 90 UG/1
AEROSOL, METERED RESPIRATORY (INHALATION)
COMMUNITY
Start: 2023-09-11 | End: 2023-12-28 | Stop reason: SDUPTHER

## 2023-11-28 RX ORDER — ACETAMINOPHEN AND CODEINE PHOSPHATE 120; 12 MG/5ML; MG/5ML
0.35 SOLUTION ORAL
COMMUNITY
Start: 2022-04-18 | End: 2024-04-01

## 2023-11-28 RX ORDER — FLUTICASONE PROPIONATE 50 MCG
1 SPRAY, SUSPENSION (ML) NASAL DAILY
Qty: 16 G | Refills: 6 | Status: SHIPPED | OUTPATIENT
Start: 2023-11-28

## 2023-11-28 NOTE — PROGRESS NOTES
Internal Medicine Clinic  TERRANCE Hansen     Patient Name: Ela Albright   : 2000  MRN:[id number]     Chief Complaint     Chief Complaint   Patient presents with    Establish Care     Hx of Asthma,DUB        History of Present Illness     23 year old AAF, presents in clinic to establish PCP. Reports SOB with waking in the AM, onset 8 days ago. Took a home COVID 19 test that was negative on 2 days ago. Reports getting sick every 2 wks. Using albuterol inhaler 2x per day since August. Reports increase migraines x 6 months. Take IBU 800mg daily prn for migraine.     PMH Asthma, anxiety, endometriosis, cystic acne, DDD neck and back pain, menorrhagia, e-cig use onset .     Patient follows with her gyn doctor every six-month.  History of blood transfusion x1 in 2018, received 2 units of pRBC. LMP 10/1/2023 through 10/5/2023. GYN Dr. Jacob Murphy every 6 months. On Orilissa since 3/2022.  Treated with hydroxyzine approximately 3 years ago for anxiety.  Dermatology Dr. Johnathon Vargas. Following ORTHO Dr. Sainz for DDD neck and back pain since . Following PT; [facility] on [address].  Previously tried marijuana, last smoked . Tried acid . Mushrooms .  Denies chest pain, shortness of breath, cough, fever, headache, dizziness, weakness, abdominal pain, nausea, vomiting, diarrhea, constipation, dysuria, depression, anxiety.     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 She is in the [employer]. Previously from [place].           Review of Systems     Review of Systems   Constitutional: Negative.    HENT: Negative.     Eyes: Negative.    Respiratory:  Positive for shortness of breath.    Cardiovascular: Negative.    Gastrointestinal: Negative.    Endocrine: Negative.    Genitourinary: Negative.    Musculoskeletal: Negative.    Integumentary:  Negative.   Allergic/Immunologic: Negative.    Neurological:  Positive for headaches.  "  Hematological: Negative.    Psychiatric/Behavioral: Negative.     All other systems reviewed and are negative.       Physical Examination     Visit Vitals  /69 (BP Location: Left arm, Patient Position: Sitting, BP Method: Large (Automatic))   Pulse 82   Temp 98.6 °F (37 °C) (Oral)   Resp 16   Ht 5' 7" (1.702 m)   Wt 103.4 kg (228 lb)   BMI 35.71 kg/m²        BP Readings from Last 6 Encounters:   11/28/23 108/69   11/02/23 123/89   07/31/22 113/75   ]    Wt Readings from Last 6 Encounters:   11/28/23 103.4 kg (228 lb)   11/02/23 102 kg (224 lb 13.9 oz)   07/30/22 87.5 kg (192 lb 14.4 oz)   ]      Physical Exam  Vitals and nursing note reviewed.   Constitutional:       Appearance: Normal appearance.   HENT:      Head: Normocephalic and atraumatic.      Right Ear: Tympanic membrane, ear canal and external ear normal.      Left Ear: Tympanic membrane, ear canal and external ear normal.      Nose: Nose normal.      Mouth/Throat:      Mouth: Mucous membranes are moist.      Pharynx: Oropharynx is clear.   Eyes:      Extraocular Movements: Extraocular movements intact.      Conjunctiva/sclera: Conjunctivae normal.      Pupils: Pupils are equal, round, and reactive to light.   Neck:      Thyroid: Thyromegaly present.   Cardiovascular:      Rate and Rhythm: Normal rate and regular rhythm.      Pulses: Normal pulses.      Heart sounds: Normal heart sounds.   Pulmonary:      Effort: Pulmonary effort is normal.      Breath sounds: Normal breath sounds.   Abdominal:      General: Abdomen is flat. Bowel sounds are normal.      Palpations: Abdomen is soft.   Musculoskeletal:         General: Normal range of motion.      Cervical back: Normal range of motion and neck supple.   Skin:     General: Skin is warm and dry.      Capillary Refill: Capillary refill takes less than 2 seconds.   Neurological:      General: No focal deficit present.      Mental Status: She is alert and oriented to person, place, and time. Mental status " "is at baseline.   Psychiatric:         Mood and Affect: Mood normal.         Behavior: Behavior normal.         Thought Content: Thought content normal.         Judgment: Judgment normal.          Labs / Imaging     Chemistry:  Lab Results   Component Value Date     11/02/2023    K 3.8 11/02/2023    CHLORIDE 107 11/02/2023    BUN 8.2 11/02/2023    CREATININE 0.85 11/02/2023    EGFRNORACEVR >60 11/02/2023    GLUCOSE 88 11/02/2023    CALCIUM 9.2 11/02/2023    ALKPHOS 75 11/02/2023    LABPROT 7.4 11/02/2023    ALBUMIN 3.8 11/02/2023    BILIDIR <0.1 04/23/2020    IBILI >0.1 04/23/2020    AST 16 11/02/2023    ALT 18 11/02/2023        No results found for: "HGBA1C", "MICROALBCREA"     Hematology:  Lab Results   Component Value Date    WBC 7.79 11/02/2023    RBC 4.41 11/02/2023    HGB 13.4 11/02/2023    HCT 40.1 11/02/2023    MCV 90.9 11/02/2023    MCH 30.4 11/02/2023    MCHC 33.4 11/02/2023    RDW 12.1 11/02/2023     11/02/2023    MPV 9.7 11/02/2023        Lipid Panel:  Lab Results   Component Value Date    CHOL 149 04/24/2020    HDL 35 (L) 04/24/2020    LDL 97.00 04/24/2020    TRIG 84 04/24/2020    TOTALCHOLEST 4 04/24/2020        Urine:  Lab Results   Component Value Date    COLORUA Yellow 11/02/2023    APPEARANCEUA Cloudy (A) 11/02/2023    SGUA 1.030 11/02/2023    PHUA 5.5 11/02/2023    PROTEINUA 1+ (A) 11/02/2023    GLUCOSEUA Normal 11/02/2023    KETONESUA Negative 11/02/2023    BLOODUA Trace (A) 11/02/2023    NITRITESUA Negative 11/02/2023    LEUKOCYTESUR Negative 11/02/2023    RBCUA 0-5 11/02/2023    WBCUA 6-10 (A) 11/02/2023    BACTERIA Trace (A) 11/02/2023    SQEPUA Many (A) 11/02/2023    HYALINECASTS None Seen 11/02/2023          Assessment       ICD-10-CM ICD-9-CM   1. Asthma, unspecified asthma severity, unspecified whether complicated, unspecified whether persistent  J45.909 493.90   2. Thyroid enlarged  E04.9 240.9   3. Generalized weakness  R53.1 780.79   4. Wellness examination  Z00.00 V70.0 "   5. Screen for STD (sexually transmitted disease)  Z11.3 V74.5   6. BMI 35.0-35.9,adult  Z68.35 V85.35        Plan     1. Asthma, unspecified asthma severity, unspecified whether complicated, unspecified whether persistent  PFT ordered  Use inhalers as prescribed (rinse mouth after use of steroid inhalers).    Use long term inhalers daily and rescue inhaler as needed.    Avoid triggers (high humidity, strong odors, chemical fumes).    Report signs of upper respiratory infection as soon as possible for early treatment.    Practice/encouraged abdominal breathing.   Eat smaller, more frequent meals.   Flu shot recommended yearly.    Smoking cessation encouraged   - Complete PFT w/ bronchodilator; Future  - Food Allergy Panel; Future    2. Thyroid enlarged    - TSH; Future  - T4, Free; Future  - US Thyroid; Future    3. Migraine without status migrainosus, not intractable, unspecified migraine type  Headache Education Provided: Stressed importance of good sleep hygiene and stress management.   Medication Overuse Education Provided: Using more than 3 OTC medications per week may worsen headaches.  Lifestyle Modifications Discussed: High intensity interval training has shown to reduce headache frequency. Low carb, high protein diet has shown to reduce headache frequency as well.  Instructed to keep headache journal.    - Ambulatory referral/consult to Internal Medicine    4. Wellness examination    - CBC Auto Differential; Future  - Comprehensive Metabolic Panel; Future  - Lipid Panel; Future  - Vitamin D; Future  - HIV 1/2 Ag/Ab (4th Gen); Future  - SYPHILIS ANTIBODY (WITH REFLEX RPR); Future  - Hepatitis Panel, Acute; Future  - Chlamydia/GC, PCR; Future  - HCG Qualitative Urine; Future    5. Screen for STD (sexually transmitted disease)    - HIV 1/2 Ag/Ab (4th Gen); Future  - SYPHILIS ANTIBODY (WITH REFLEX RPR); Future  - Hepatitis Panel, Acute; Future  - Chlamydia/GC, PCR; Future  - HCG Qualitative Urine; Future    6.  BMI 35.0-35.9,adult  Goal BMI <30.  Exercise 5 times a week for 30 minutes per day.  Avoid soda, simple sugars, excessive rice, potatoes or bread. Limit fast foods and fried foods.  Choose complex carbs in moderation (example: green vegetables, beans, oatmeal). Eat plenty of fresh fruits and vegetables with lean meats daily.  Do not skip meals. Eat a balanced portion size.  Avoid fad diets. Consider permanent healthy life style changes.       7. Other tobacco product nicotine dependence, uncomplicated  Smoking cessation advised. Instructed on smoking cessation program through [facility] and pharmacological interventions to aid in cessation.  >5 minutes allotted to educate patient on the harms of smoking, the urgency to quit, and the development of a plan for smoking cessation.         Current Outpatient Medications   Medication Instructions    albuterol (PROVENTIL/VENTOLIN HFA) 90 mcg/actuation inhaler SMARTSI-2 Puff(s) Via Inhaler Every 4-6 Hours PRN    azelaic acid (AZELEX) 15 % gel     fluticasone propionate (FLONASE) 50 mcg, Each Nostril, Daily    ibuprofen (ADVIL,MOTRIN) 800 mg, Oral, Every 6 hours PRN    montelukast (SINGULAIR) 10 mg tablet     norethindrone (STEPH) 0.35 mg, Oral    ORILISSA 150 mg Tab        Orders Placed This Encounter   Procedures    Chlamydia/GC, PCR    US Thyroid    CBC Auto Differential    Comprehensive Metabolic Panel    Lipid Panel    Vitamin D    HIV 1/2 Ag/Ab (4th Gen)    SYPHILIS ANTIBODY (WITH REFLEX RPR)    Hepatitis Panel, Acute    HCG Qualitative Urine    TSH    T4, Free    Food Allergy Panel    Complete PFT w/ bronchodilator         Future Appointments   Date Time Provider Department Center   2023  8:00 AM PFT, [facility] PULMONARY FUNCTION SERVICES [facility] [place]    2023  9:00 AM [facility] US1 [facility] [place]   2023  1:15 PM Larissa Pinzon FNP [facility] [place]        Follow up in about 2 weeks (around 2023).    Labs thoroughly reviewed with  patient. Medication refills addressed today.  RTC prn and 2 weeks, with labs 1 week prior to the apt.  COVID 19 precautions given to patient.  Patient voices understanding of all discharge instructions.      TERRANCE Hansen

## 2023-12-03 NOTE — PROGRESS NOTES
Internal Medicine Clinic  TERRANCE Hansen     Patient Name: Ela Albright   : 2000  MRN:[id number]     Chief Complaint     Chief Complaint   Patient presents with    Establish Care     Hx of Asthma,DUB        History of Present Illness     23 year old AAF, presents in clinic to establish PCP. Reports SOB with waking in the AM, onset 8 days ago. Took a home COVID 19 test that was negative on 2 days ago. Reports getting sick every 2 wks. Using albuterol inhaler 2x per day since August. Reports increase migraines x 6 months. Take IBU 800mg daily prn for migraine.      PMH Asthma, anxiety, endometriosis, cystic acne, DDD neck and back pain, menorrhagia, e-cig use onset .      Patient follows with her gyn doctor every six-month.  History of blood transfusion x1 in 2018, received 2 units of pRBC. LMP 10/1/2023 through 10/5/2023. GYN Dr. Jacob Murphy every 6 months. On Orilissa since 3/2022.  Treated with hydroxyzine approximately 3 years ago for anxiety.  Dermatology Dr. Johnathon Vargas. Following ORTHO Dr. Sainz for DDD neck and back pain since . Following PT; [facility] on [address].  Previously tried marijuana, last smoked . Tried acid . Mushrooms .  Denies chest pain, shortness of breath, cough, fever, headache, dizziness, weakness, abdominal pain, nausea, vomiting, diarrhea, constipation, dysuria, depression, anxiety.      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 She is in the [employer]. Previously from [place].             Review of Systems     Review of Systems   Constitutional: Negative.    HENT: Negative.     Eyes: Negative.    Respiratory:  Positive for shortness of breath.    Cardiovascular: Negative.    Gastrointestinal: Negative.    Endocrine: Negative.    Genitourinary: Negative.    Musculoskeletal: Negative.    Integumentary:  Negative.   Allergic/Immunologic: Negative.    Neurological:  Positive for headaches.  "  Hematological: Negative.    Psychiatric/Behavioral: Negative.     All other systems reviewed and are negative.       Physical Examination     Visit Vitals  /69 (BP Location: Left arm, Patient Position: Sitting, BP Method: Large (Automatic))   Pulse 82   Temp 98.6 °F (37 °C) (Oral)   Resp 16   Ht 5' 7" (1.702 m)   Wt 103.4 kg (228 lb)   BMI 35.71 kg/m²        BP Readings from Last 6 Encounters:   11/28/23 108/69   11/02/23 123/89   07/31/22 113/75   ]    Wt Readings from Last 6 Encounters:   11/28/23 103.4 kg (228 lb)   11/02/23 102 kg (224 lb 13.9 oz)   07/30/22 87.5 kg (192 lb 14.4 oz)   ]      Physical Exam  Vitals and nursing note reviewed.   Constitutional:       Appearance: Normal appearance.   HENT:      Head: Normocephalic and atraumatic.      Right Ear: Tympanic membrane, ear canal and external ear normal.      Left Ear: Tympanic membrane, ear canal and external ear normal.      Nose: Nose normal.      Mouth/Throat:      Mouth: Mucous membranes are moist.      Pharynx: Oropharynx is clear.   Eyes:      Extraocular Movements: Extraocular movements intact.      Conjunctiva/sclera: Conjunctivae normal.      Pupils: Pupils are equal, round, and reactive to light.   Neck:      Thyroid: Thyromegaly present.   Cardiovascular:      Rate and Rhythm: Normal rate and regular rhythm.      Pulses: Normal pulses.      Heart sounds: Normal heart sounds.   Pulmonary:      Effort: Pulmonary effort is normal.      Breath sounds: Normal breath sounds.   Abdominal:      General: Abdomen is flat. Bowel sounds are normal.      Palpations: Abdomen is soft.   Musculoskeletal:         General: Normal range of motion.      Cervical back: Normal range of motion and neck supple.   Skin:     General: Skin is warm and dry.      Capillary Refill: Capillary refill takes less than 2 seconds.   Neurological:      General: No focal deficit present.      Mental Status: She is alert and oriented to person, place, and time. Mental status " "is at baseline.   Psychiatric:         Mood and Affect: Mood normal.         Behavior: Behavior normal.         Thought Content: Thought content normal.         Judgment: Judgment normal.          Labs / Imaging     Chemistry:  Lab Results   Component Value Date     11/02/2023    K 3.8 11/02/2023    CHLORIDE 107 11/02/2023    BUN 8.2 11/02/2023    CREATININE 0.85 11/02/2023    EGFRNORACEVR >60 11/02/2023    GLUCOSE 88 11/02/2023    CALCIUM 9.2 11/02/2023    ALKPHOS 75 11/02/2023    LABPROT 7.4 11/02/2023    ALBUMIN 3.8 11/02/2023    BILIDIR <0.1 04/23/2020    IBILI >0.1 04/23/2020    AST 16 11/02/2023    ALT 18 11/02/2023        No results found for: "HGBA1C", "MICROALBCREA"     Hematology:  Lab Results   Component Value Date    WBC 7.79 11/02/2023    RBC 4.41 11/02/2023    HGB 13.4 11/02/2023    HCT 40.1 11/02/2023    MCV 90.9 11/02/2023    MCH 30.4 11/02/2023    MCHC 33.4 11/02/2023    RDW 12.1 11/02/2023     11/02/2023    MPV 9.7 11/02/2023        Lipid Panel:  Lab Results   Component Value Date    CHOL 149 04/24/2020    HDL 35 (L) 04/24/2020    LDL 97.00 04/24/2020    TRIG 84 04/24/2020    TOTALCHOLEST 4 04/24/2020        Urine:  Lab Results   Component Value Date    COLORUA Yellow 11/02/2023    APPEARANCEUA Cloudy (A) 11/02/2023    SGUA 1.030 11/02/2023    PHUA 5.5 11/02/2023    PROTEINUA 1+ (A) 11/02/2023    GLUCOSEUA Normal 11/02/2023    KETONESUA Negative 11/02/2023    BLOODUA Trace (A) 11/02/2023    NITRITESUA Negative 11/02/2023    LEUKOCYTESUR Negative 11/02/2023    RBCUA 0-5 11/02/2023    WBCUA 6-10 (A) 11/02/2023    BACTERIA Trace (A) 11/02/2023    SQEPUA Many (A) 11/02/2023    HYALINECASTS None Seen 11/02/2023          Assessment       ICD-10-CM ICD-9-CM   1. Asthma, unspecified asthma severity, unspecified whether complicated, unspecified whether persistent  J45.909 493.90   2. Thyroid enlarged  E04.9 240.9   3. Migraine without status migrainosus, not intractable, unspecified migraine " type  G43.909 346.90   4. Wellness examination  Z00.00 V70.0   5. Screen for STD (sexually transmitted disease)  Z11.3 V74.5   6. BMI 35.0-35.9,adult  Z68.35 V85.35   7. Other tobacco product nicotine dependence, uncomplicated  F17.290 305.1        Plan     1. Asthma, unspecified asthma severity, unspecified whether complicated, unspecified whether persistent  PFT ordered   Restart Singulair, claritin and flonase  Use inhalers as prescribed (rinse mouth after use of steroid inhalers).    Use long term inhalers daily and rescue inhaler as needed.    Avoid triggers (high humidity, strong odors, chemical fumes).    Report signs of upper respiratory infection as soon as possible for early treatment.    Practice/encouraged abdominal breathing.   Eat smaller, more frequent meals.   Flu shot recommended yearly.    Smoking cessation encouraged   - Complete PFT w/ bronchodilator; Future  - Food Allergy Panel; Future    2. Thyroid enlarged    - TSH; Future  - T4, Free; Future  - US Thyroid; Future    3. Migraine without status migrainosus, not intractable, unspecified migraine type  Headache Education Provided: Stressed importance of good sleep hygiene and stress management.   Medication Overuse Education Provided: Using more than 3 OTC medications per week may worsen headaches.  Lifestyle Modifications Discussed: High intensity interval training has shown to reduce headache frequency. Low carb, high protein diet has shown to reduce headache frequency as well.  Instructed to keep headache journal.    - Ambulatory referral/consult to Internal Medicine    4. Wellness examination    - CBC Auto Differential; Future  - Comprehensive Metabolic Panel; Future  - Lipid Panel; Future  - Vitamin D; Future  - HIV 1/2 Ag/Ab (4th Gen); Future  - SYPHILIS ANTIBODY (WITH REFLEX RPR); Future  - Hepatitis Panel, Acute; Future  - Chlamydia/GC, PCR; Future  - HCG Qualitative Urine; Future    5. Screen for STD (sexually transmitted disease)    -  HIV 1/2 Ag/Ab (4th Gen); Future  - SYPHILIS ANTIBODY (WITH REFLEX RPR); Future  - Hepatitis Panel, Acute; Future  - Chlamydia/GC, PCR; Future  - HCG Qualitative Urine; Future    6. BMI 35.0-35.9,adult  Goal BMI <30.  Exercise 5 times a week for 30 minutes per day.  Avoid soda, simple sugars, excessive rice, potatoes or bread. Limit fast foods and fried foods.  Choose complex carbs in moderation (example: green vegetables, beans, oatmeal). Eat plenty of fresh fruits and vegetables with lean meats daily.  Do not skip meals. Eat a balanced portion size.  Avoid fad diets. Consider permanent healthy life style changes.       7. Other tobacco product nicotine dependence, uncomplicated  Smoking cessation advised. Instructed on smoking cessation program through [facility] and pharmacological interventions to aid in cessation.  >5 minutes allotted to educate patient on the harms of smoking, the urgency to quit, and the development of a plan for smoking cessation.          Current Outpatient Medications   Medication Instructions    albuterol (PROVENTIL/VENTOLIN HFA) 90 mcg/actuation inhaler SMARTSI-2 Puff(s) Via Inhaler Every 4-6 Hours PRN    azelaic acid (AZELEX) 15 % gel     fluticasone propionate (FLONASE) 50 mcg, Each Nostril, Daily    ibuprofen (ADVIL,MOTRIN) 800 mg, Oral, Every 6 hours PRN    montelukast (SINGULAIR) 10 mg tablet     norethindrone (STEPH) 0.35 mg, Oral    ORILISSA 150 mg Tab        Orders Placed This Encounter   Procedures    Chlamydia/GC, PCR    US Thyroid    CBC Auto Differential    Comprehensive Metabolic Panel    Lipid Panel    Vitamin D    HIV 1/2 Ag/Ab (4th Gen)    SYPHILIS ANTIBODY (WITH REFLEX RPR)    Hepatitis Panel, Acute    HCG Qualitative Urine    TSH    T4, Free    Food Allergy Panel    Complete PFT w/ bronchodilator         Future Appointments   Date Time Provider Department Center   2023  8:00 AM PFT, [facility] [place]    2023  9:00 AM [facility] US  [facility] [place]   12/28/2023  1:15 PM Larissa Pinzon FNP [facility] [place]        Follow up in about 2 weeks (around 12/12/2023).    Labs thoroughly reviewed with patient. Medication refills addressed today.  RTC prn and 2 wks, with labs 1 week prior to the apt.  COVID 19 precautions given to patient.  Patient voices understanding of all discharge instructions.      TERRANCE Hansen

## 2023-12-08 ENCOUNTER — HOSPITAL ENCOUNTER (OUTPATIENT)
Dept: PULMONOLOGY | Facility: HOSPITAL | Age: 23
Discharge: HOME | End: 2023-12-08
Attending: NURSE PRACTITIONER
Payer: OTHER GOVERNMENT

## 2023-12-08 ENCOUNTER — HOSPITAL ENCOUNTER (OUTPATIENT)
Dept: RADIOLOGY | Facility: HOSPITAL | Age: 23
Discharge: HOME | End: 2023-12-08
Attending: NURSE PRACTITIONER
Payer: OTHER GOVERNMENT

## 2023-12-08 DIAGNOSIS — E04.9 THYROID ENLARGED: ICD-10-CM

## 2023-12-08 DIAGNOSIS — J45.909 ASTHMA, UNSPECIFIED ASTHMA SEVERITY, UNSPECIFIED WHETHER COMPLICATED, UNSPECIFIED WHETHER PERSISTENT: ICD-10-CM

## 2023-12-08 PROCEDURE — 94726 PLETHYSMOGRAPHY LUNG VOLUMES: CPT

## 2023-12-08 PROCEDURE — 94060 EVALUATION OF WHEEZING: CPT

## 2023-12-08 PROCEDURE — 94640 AIRWAY INHALATION TREATMENT: CPT

## 2023-12-08 PROCEDURE — 76536 US EXAM OF HEAD AND NECK: CPT | Mod: TC

## 2023-12-08 PROCEDURE — 94729 DIFFUSING CAPACITY: CPT

## 2023-12-11 LAB
DLCO SINGLE BREATH LLN: 23.99
DLCO SINGLE BREATH PRE REF: 69.6 %
DLCO SINGLE BREATH REF: 29.73
DLCOC SBVA LLN: 4.01
DLCOC SBVA REF: 5.46
DLCOC SINGLE BREATH LLN: 23.99
DLCOC SINGLE BREATH REF: 29.73
DLCOVA LLN: 4.01
DLCOVA PRE REF: 89.6 %
DLCOVA PRE: 4.89 ML/(MIN*MMHG*L) (ref 4.01–6.91)
DLCOVA REF: 5.46
ERV LLN: -16448.64
ERV PRE REF: 51.4 %
ERV REF: 1.36
FEF 25 75 CHG: 34.9 %
FEF 25 75 LLN: 2.1
FEF 25 75 POST REF: 58.4 %
FEF 25 75 PRE REF: 43.3 %
FEF 25 75 REF: 3.54
FET100 CHG: 7.3 %
FEV1 CHG: 10.5 %
FEV1 FVC CHG: 11.3 %
FEV1 FVC LLN: 75
FEV1 FVC POST REF: 87.4 %
FEV1 FVC PRE REF: 78.5 %
FEV1 FVC REF: 86
FEV1 LLN: 2.43
FEV1 POST REF: 82.7 %
FEV1 PRE REF: 74.8 %
FEV1 REF: 3.11
FRCPLETH LLN: 2.01
FRCPLETH PREREF: 86.5 %
FRCPLETH REF: 2.84
FVC CHG: -0.8 %
FVC LLN: 2.83
FVC POST REF: 94.1 %
FVC PRE REF: 94.8 %
FVC REF: 3.62
IVC PRE: 3.59 L (ref 2.83–4.44)
IVC SINGLE BREATH LLN: 2.83
IVC SINGLE BREATH PRE REF: 99.1 %
IVC SINGLE BREATH REF: 3.62
MVV LLN: 119
MVV PRE REF: 47.4 %
MVV REF: 140
PEF CHG: 6.5 %
PEF LLN: 5.22
PEF POST REF: 74.6 %
PEF PRE REF: 70.1 %
PEF REF: 7.41
POST FEF 25 75: 2.07 L/S (ref 2.1–5.23)
POST FET 100: 7.48 SEC
POST FEV1 FVC: 75.46 % (ref 75.09–95.11)
POST FEV1: 2.57 L (ref 2.43–3.76)
POST FVC: 3.4 L (ref 2.83–4.44)
POST PEF: 5.53 L/S (ref 5.22–9.61)
PRE DLCO: 20.68 ML/(MIN*MMHG) (ref 23.99–35.46)
PRE ERV: 0.7 L (ref -16448.64–16451.36)
PRE FEF 25 75: 1.53 L/S (ref 2.1–5.23)
PRE FET 100: 6.98 SEC
PRE FEV1 FVC: 67.78 % (ref 75.09–95.11)
PRE FEV1: 2.33 L (ref 2.43–3.76)
PRE FRC PL: 2.45 L (ref 2.01–3.66)
PRE FVC: 3.43 L (ref 2.83–4.44)
PRE MVV: 66.33 L/MIN (ref 118.9–160.87)
PRE PEF: 5.2 L/S (ref 5.22–9.61)
PRE RV: 1.75 L (ref 0.9–2.06)
PRE TLC: 5.4 L (ref 4.45–6.43)
RAW LLN: 3.06
RAW PRE REF: 8.1 %
RAW PRE: 0.25 CMH2O*S/L (ref 3.06–3.06)
RAW REF: 3.06
RV LLN: 0.9
RV PRE REF: 118.6 %
RV REF: 1.48
RVTLC LLN: 18
RVTLC PRE REF: 118.3 %
RVTLC PRE: 32.49 % (ref 17.87–37.05)
RVTLC REF: 27
TLC LLN: 4.45
TLC PRE REF: 99.2 %
TLC REF: 5.44
VA PRE: 4.23 L (ref 5.29–5.29)
VA SINGLE BREATH LLN: 5.29
VA SINGLE BREATH PRE REF: 79.9 %
VA SINGLE BREATH REF: 5.29
VC LLN: 2.83
VC PRE REF: 100.7 %
VC PRE: 3.65 L (ref 2.83–4.44)
VC REF: 3.62

## 2023-12-12 ENCOUNTER — TELEPHONE (OUTPATIENT)
Dept: INTERNAL MEDICINE | Facility: CLINIC | Age: 23
End: 2023-12-12
Payer: OTHER GOVERNMENT

## 2023-12-12 NOTE — TELEPHONE ENCOUNTER
----- Message from TERRANCE Hansen sent at 12/11/2023  5:41 PM CST -----  Regarding: FW:  Please let pt know there were no acute concerns noted on ultrasound of neck. Keep your apt for further discussion. Thx  ----- Message -----  From: Interface, Rad Results In  Sent: 12/8/2023   9:22 AM CST  To: TERRANCE Hansen

## 2023-12-28 ENCOUNTER — TELEPHONE (OUTPATIENT)
Dept: INTERNAL MEDICINE | Facility: CLINIC | Age: 23
End: 2023-12-28

## 2023-12-28 ENCOUNTER — OFFICE VISIT (OUTPATIENT)
Dept: INTERNAL MEDICINE | Facility: CLINIC | Age: 23
End: 2023-12-28
Payer: OTHER GOVERNMENT

## 2023-12-28 VITALS
HEIGHT: 67 IN | WEIGHT: 223 LBS | BODY MASS INDEX: 35 KG/M2 | RESPIRATION RATE: 16 BRPM | HEART RATE: 81 BPM | TEMPERATURE: 98 F | DIASTOLIC BLOOD PRESSURE: 74 MMHG | SYSTOLIC BLOOD PRESSURE: 112 MMHG

## 2023-12-28 DIAGNOSIS — E55.9 VITAMIN D DEFICIENCY: ICD-10-CM

## 2023-12-28 DIAGNOSIS — Z91.018 MULTIPLE FOOD ALLERGIES: ICD-10-CM

## 2023-12-28 DIAGNOSIS — R94.2 ABNORMAL PFTS (PULMONARY FUNCTION TESTS): ICD-10-CM

## 2023-12-28 DIAGNOSIS — J45.909 ASTHMA, UNSPECIFIED ASTHMA SEVERITY, UNSPECIFIED WHETHER COMPLICATED, UNSPECIFIED WHETHER PERSISTENT: ICD-10-CM

## 2023-12-28 PROCEDURE — 99214 OFFICE O/P EST MOD 30 MIN: CPT | Mod: PBBFAC | Performed by: NURSE PRACTITIONER

## 2023-12-28 PROCEDURE — 99214 PR OFFICE/OUTPT VISIT, EST, LEVL IV, 30-39 MIN: ICD-10-PCS | Mod: S$PBB,,, | Performed by: NURSE PRACTITIONER

## 2023-12-28 PROCEDURE — 99214 OFFICE O/P EST MOD 30 MIN: CPT | Mod: S$PBB,,, | Performed by: NURSE PRACTITIONER

## 2023-12-28 RX ORDER — AZELASTINE 1 MG/ML
1 SPRAY, METERED NASAL 2 TIMES DAILY
Qty: 30 ML | Refills: 11 | Status: SHIPPED | OUTPATIENT
Start: 2023-12-28 | End: 2024-12-27

## 2023-12-28 RX ORDER — ALBUTEROL SULFATE 90 UG/1
AEROSOL, METERED RESPIRATORY (INHALATION)
Qty: 18 G | Refills: 2 | Status: SHIPPED | OUTPATIENT
Start: 2023-12-28 | End: 2024-04-01 | Stop reason: SDUPTHER

## 2023-12-28 RX ORDER — ASPIRIN 325 MG
50000 TABLET, DELAYED RELEASE (ENTERIC COATED) ORAL
Qty: 12 CAPSULE | Refills: 0 | Status: SHIPPED | OUTPATIENT
Start: 2023-12-28

## 2023-12-28 RX ORDER — TIOTROPIUM BROMIDE INHALATION SPRAY 1.56 UG/1
2 SPRAY, METERED RESPIRATORY (INHALATION) DAILY
Qty: 4 G | Refills: 6 | Status: SHIPPED | OUTPATIENT
Start: 2023-12-28 | End: 2024-04-01 | Stop reason: SDUPTHER

## 2023-12-28 RX ORDER — ACETAMINOPHEN 500 MG
2000 TABLET ORAL DAILY
Qty: 90 CAPSULE | Refills: 1 | Status: SHIPPED | OUTPATIENT
Start: 2023-12-28 | End: 2024-04-01 | Stop reason: SDUPTHER

## 2023-12-28 RX ORDER — EPINEPHRINE 0.3 MG/.3ML
1 INJECTION SUBCUTANEOUS ONCE
Qty: 2 EACH | Refills: 0 | Status: SHIPPED | OUTPATIENT
Start: 2023-12-28 | End: 2023-12-28

## 2023-12-28 NOTE — TELEPHONE ENCOUNTER
Please inform the pt the lab called me back. Juan Diego said this panel does not break down which test the patient is positive for. They said you'll have in individually order each test.

## 2023-12-28 NOTE — PROGRESS NOTES
[facility]  [facility]     Patient Name: Ela Albright   : 2000  MRN:[id number]     Chief Complaint     Chief Complaint   Patient presents with    Follow-up     Lab review        History of Present Illness     23 year old AAF, presents in clinic for lab f/u. PFT 23 results show moderate obstruction with significant response. Normal lung volumes. Diffusion is mildly reduced but is normal per alveolar volume.  Food panel is positive. History of Asthma. Reports h/o frequent URI's.    PMH Asthma, anxiety, endometriosis, cystic acne, DDD neck and back pain, menorrhagia, e-cig use onset .      Patient follows with her gyn doctor every six-month.  History of blood transfusion x1 in 2018, received 2 units of pRBC. LMP 10/1/2023 through 10/5/2023. GYN Dr. Jacob Murphy every 6 months. On Orilissa since 3/2022.  Treated with hydroxyzine approximately 3 years ago for anxiety.  Dermatology Dr. Johnathon Vargas. Following ORTHO Dr. Sainz for DDD neck and back pain since . Following PT; [facility] on [address].  Previously tried marijuana, last smoked . Tried acid . Mushrooms .  Denies chest pain, shortness of breath, cough, fever, headache, dizziness, weakness, abdominal pain, nausea, vomiting, diarrhea, constipation, dysuria, depression, anxiety.      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 Previously from [place].                Review of Systems     Review of Systems   Constitutional: Negative.    HENT: Negative.     Eyes: Negative.    Respiratory: Negative.     Cardiovascular: Negative.    Gastrointestinal: Negative.    Endocrine: Negative.    Genitourinary: Negative.    Musculoskeletal: Negative.    Integumentary:  Negative.   Allergic/Immunologic: Negative.    Neurological: Negative.    Hematological: Negative.    Psychiatric/Behavioral: Negative.     All other systems reviewed  "and are negative.       Physical Examination     Visit Vitals  /74 (BP Location: Right arm, Patient Position: Sitting, BP Method: Large (Automatic))   Pulse 81   Temp 97.9 °F (36.6 °C) (Oral)   Resp 16   Ht 5' 7" (1.702 m)   Wt 101.2 kg (223 lb)   BMI 34.93 kg/m²        BP Readings from Last 6 Encounters:   12/28/23 112/74   11/28/23 108/69   11/02/23 123/89   07/31/22 113/75   ]    Wt Readings from Last 6 Encounters:   12/28/23 101.2 kg (223 lb)   11/28/23 103.4 kg (228 lb)   11/02/23 102 kg (224 lb 13.9 oz)   07/30/22 87.5 kg (192 lb 14.4 oz)   ]      Physical Exam  Vitals and nursing note reviewed.   Constitutional:       Appearance: Normal appearance.   HENT:      Head: Normocephalic and atraumatic.      Right Ear: Tympanic membrane, ear canal and external ear normal.      Left Ear: Tympanic membrane, ear canal and external ear normal.      Nose: Nose normal.      Mouth/Throat:      Mouth: Mucous membranes are moist.      Pharynx: Oropharynx is clear.   Eyes:      Extraocular Movements: Extraocular movements intact.      Conjunctiva/sclera: Conjunctivae normal.      Pupils: Pupils are equal, round, and reactive to light.   Cardiovascular:      Rate and Rhythm: Normal rate and regular rhythm.      Pulses: Normal pulses.      Heart sounds: Normal heart sounds.   Pulmonary:      Effort: Pulmonary effort is normal.      Breath sounds: Normal breath sounds.   Abdominal:      General: Abdomen is flat. Bowel sounds are normal.      Palpations: Abdomen is soft.   Musculoskeletal:         General: Normal range of motion.      Cervical back: Normal range of motion and neck supple.   Skin:     General: Skin is warm and dry.      Capillary Refill: Capillary refill takes less than 2 seconds.   Neurological:      General: No focal deficit present.      Mental Status: She is alert and oriented to person, place, and time. Mental status is at baseline.   Psychiatric:         Mood and Affect: Mood normal.         Behavior: " "Behavior normal.         Thought Content: Thought content normal.         Judgment: Judgment normal.        Labs / Imaging     Chemistry:  Lab Results   Component Value Date     12/08/2023    K 4.1 12/08/2023    CHLORIDE 106 12/08/2023    BUN 9.3 12/08/2023    CREATININE 0.82 12/08/2023    EGFRNORACEVR >60 12/08/2023    GLUCOSE 95 12/08/2023    CALCIUM 9.4 12/08/2023    ALKPHOS 77 12/08/2023    LABPROT 7.7 12/08/2023    ALBUMIN 3.9 12/08/2023    BILIDIR <0.1 04/23/2020    IBILI >0.1 04/23/2020    AST 13 12/08/2023    ALT 12 12/08/2023    WBKBPJBR17DI 9.8 (L) 12/08/2023        No results found for: "HGBA1C", "MICROALBCREA"     Hematology:  Lab Results   Component Value Date    WBC 9.32 12/08/2023    RBC 4.76 12/08/2023    HGB 14.0 12/08/2023    HCT 42.8 12/08/2023    MCV 89.9 12/08/2023    MCH 29.4 12/08/2023    MCHC 32.7 (L) 12/08/2023    RDW 12.1 12/08/2023     12/08/2023    MPV 9.5 12/08/2023        Lipid Panel:  Lab Results   Component Value Date    CHOL 168 12/08/2023    HDL 38 12/08/2023    .00 12/08/2023    TRIG 84 12/08/2023    TOTALCHOLEST 4 12/08/2023        Urine:  Lab Results   Component Value Date    COLORUA Yellow 11/02/2023    APPEARANCEUA Cloudy (A) 11/02/2023    SGUA 1.030 11/02/2023    PHUA 5.5 11/02/2023    PROTEINUA 1+ (A) 11/02/2023    GLUCOSEUA Normal 11/02/2023    KETONESUA Negative 11/02/2023    BLOODUA Trace (A) 11/02/2023    NITRITESUA Negative 11/02/2023    LEUKOCYTESUR Negative 11/02/2023    RBCUA 0-5 11/02/2023    WBCUA 6-10 (A) 11/02/2023    BACTERIA Trace (A) 11/02/2023    SQEPUA Many (A) 11/02/2023    HYALINECASTS None Seen 11/02/2023          Assessment       ICD-10-CM ICD-9-CM   1. Asthma, unspecified asthma severity, unspecified whether complicated, unspecified whether persistent  J45.909 493.90   2. Multiple food allergies  Z91.018 V15.05   3. Abnormal PFTs (pulmonary function tests)  R94.2 794.2   4. BMI 34.0-34.9,adult  Z68.34 V85.34   5. Vitamin D deficiency  " E55.9 268.9        Plan     1. Asthma, unspecified asthma severity, unspecified whether complicated, unspecified whether persistent  Last PFT 23  -CXR ordered   Use inhalers as prescribed (rinse mouth after use of steroid inhalers).    Use long term inhalers daily and rescue inhaler as needed.    Avoid triggers (high humidity, strong odors, chemical fumes).    Report signs of upper respiratory infection as soon as possible for early treatment.    Practice/encouraged abdominal breathing.   Eat smaller, more frequent meals.   Flu shot recommended yearly.    Smoking cessation encouraged   - albuterol (PROVENTIL/VENTOLIN HFA) 90 mcg/actuation inhaler; SMARTSI-2 Puff(s) Via Inhaler Every 4-6 Hours PRN  Dispense: 18 g; Refill: 2  - tiotropium bromide (SPIRIVA RESPIMAT) 1.25 mcg/actuation inhaler; Inhale 2 puffs into the lungs once daily. Controller  Dispense: 4 g; Refill: 6  - EPINEPHrine (AUVI-Q) 0.3 mg/0.3 mL AtIn; Inject 0.3 mLs (0.3 mg total) into the muscle once. After 5-15 minutes may repeat  dose x 1 for 1 dose  Dispense: 2 each; Refill: 0  - X-Ray Chest PA And Lateral; Future    2. Multiple food allergies    - EPINEPHrine (AUVI-Q) 0.3 mg/0.3 mL AtIn; Inject 0.3 mLs (0.3 mg total) into the muscle once. After 5-15 minutes may repeat  dose x 1 for 1 dose  Dispense: 2 each; Refill: 0    3. Abnormal PFTs (pulmonary function tests)  PFTs reviewed, will start on Spirivia, and albuterol prn  Avoid smoking, nicotine and marijuana   Regular exercise.  Add zyrtec, rotate flonase and astelin  Cont Singulair.  CXR  Pt will check with insurance and let me know where to refer for Allergy specialist  Avoid triggers    4. BMI 34.0-34.9,adult  Goal BMI <30.  Exercise 5 times a week for 30 minutes per day.  Avoid soda, simple sugars, excessive rice, potatoes or bread. Limit fast foods and fried foods.  Choose complex carbs in moderation (example: green vegetables, beans, oatmeal). Eat plenty of fresh fruits and vegetables  with lean meats daily.  Do not skip meals. Eat a balanced portion size.  Avoid fad diets. Consider permanent healthy life style changes.       5. Vitamin D deficiency  Vitamin D level is low. Will start patient on a prescription of vitamin D3 99839 IU once a week for 12 weeks. Once this prescription is completed, patient advised to purchase OTC vitamin D3 2000 IU and take this once a day thereafter or unless notified otherwise. Repeat Vitamin D level at follow up. Last PFT- /Last CXR-   Use inhalers as prescribed (rinse mouth after use of steroid inhalers).    Use long term inhalers daily and rescue inhaler as needed.    Avoid triggers (high humidity, strong odors, chemical fumes).    Report signs of upper respiratory infection as soon as possible for early treatment.    Practice/encouraged abdominal breathing.   Eat smaller, more frequent meals.   Flu shot recommended yearly.    Smoking cessation encouraged   - cholecalciferol, vitamin D3, 1,250 mcg (50,000 unit) capsule; Take 1 capsule (50,000 Units total) by mouth every 7 days.  Dispense: 12 capsule; Refill: 0      Juan Diego said this panel does not break down which test the patient is positive for. They said you'll have in individually order each test.     Current Outpatient Medications   Medication Instructions    albuterol (PROVENTIL/VENTOLIN HFA) 90 mcg/actuation inhaler SMARTSI-2 Puff(s) Via Inhaler Every 4-6 Hours PRN    azelaic acid (AZELEX) 15 % gel     azelastine (ASTELIN) 137 mcg, Nasal, 2 times daily    cholecalciferol (vitamin D3) (VITAMIN D3) 2,000 Units, Oral, Daily    cholecalciferol (vitamin D3) 50,000 Units, Oral, Every 7 days    EPINEPHrine (AUVI-Q) 0.3 mg, Intramuscular, Once, After 5-15 minutes may repeat  dose x 1    fluticasone propionate (FLONASE) 50 mcg, Each Nostril, Daily    ibuprofen (ADVIL,MOTRIN) 800 mg, Oral, Every 6 hours PRN    montelukast (SINGULAIR) 10 mg tablet     norethindrone (STEPH) 0.35 mg, Oral    ORILISSA 150 mg Tab      tiotropium bromide (SPIRIVA RESPIMAT) 1.25 mcg/actuation inhaler 2 puffs, Inhalation, Daily, Controller       Orders Placed This Encounter   Procedures    X-Ray Chest PA And Lateral         Future Appointments   Date Time Provider Department Center   3/28/2024  1:40 PM [facility]        Follow up in about 3 months (around 3/28/2024) for Virtual Visit.    Labs thoroughly reviewed with patient. Medication refills addressed today.  RTC prn and 3-4 months, with labs 1 week prior to the apt.  COVID 19 precautions given to patient.  Patient voices understanding of all discharge instructions.      TER[facility] Hansen

## 2024-01-02 ENCOUNTER — DOCUMENTATION ONLY (OUTPATIENT)
Dept: ADMINISTRATIVE | Facility: HOSPITAL | Age: 24
End: 2024-01-02
Payer: OTHER GOVERNMENT

## 2024-04-01 ENCOUNTER — OFFICE VISIT (OUTPATIENT)
Dept: INTERNAL MEDICINE | Facility: CLINIC | Age: 24
End: 2024-04-01
Payer: OTHER GOVERNMENT

## 2024-04-01 VITALS
BODY MASS INDEX: 20.93 KG/M2 | DIASTOLIC BLOOD PRESSURE: 76 MMHG | HEART RATE: 64 BPM | WEIGHT: 133.38 LBS | RESPIRATION RATE: 20 BRPM | TEMPERATURE: 98 F | SYSTOLIC BLOOD PRESSURE: 107 MMHG | OXYGEN SATURATION: 97 % | HEIGHT: 67 IN

## 2024-04-01 DIAGNOSIS — J45.909 ASTHMA, UNSPECIFIED ASTHMA SEVERITY, UNSPECIFIED WHETHER COMPLICATED, UNSPECIFIED WHETHER PERSISTENT: ICD-10-CM

## 2024-04-01 DIAGNOSIS — E55.9 VITAMIN D DEFICIENCY: ICD-10-CM

## 2024-04-01 DIAGNOSIS — Z11.3 SCREEN FOR STD (SEXUALLY TRANSMITTED DISEASE): ICD-10-CM

## 2024-04-01 DIAGNOSIS — Z00.00 WELLNESS EXAMINATION: ICD-10-CM

## 2024-04-01 PROCEDURE — 99214 OFFICE O/P EST MOD 30 MIN: CPT | Mod: S$PBB,,, | Performed by: NURSE PRACTITIONER

## 2024-04-01 PROCEDURE — 99214 OFFICE O/P EST MOD 30 MIN: CPT | Mod: PBBFAC | Performed by: NURSE PRACTITIONER

## 2024-04-01 RX ORDER — ACETAMINOPHEN 500 MG
2000 TABLET ORAL DAILY
Qty: 90 CAPSULE | Refills: 3 | Status: SHIPPED | OUTPATIENT
Start: 2024-04-01

## 2024-04-01 RX ORDER — ALBUTEROL SULFATE 90 UG/1
AEROSOL, METERED RESPIRATORY (INHALATION)
Qty: 18 G | Refills: 2 | Status: SHIPPED | OUTPATIENT
Start: 2024-04-01

## 2024-04-01 RX ORDER — TIOTROPIUM BROMIDE INHALATION SPRAY 1.56 UG/1
2 SPRAY, METERED RESPIRATORY (INHALATION) DAILY
Qty: 4 G | Refills: 6 | Status: SHIPPED | OUTPATIENT
Start: 2024-04-01

## 2024-04-01 RX ORDER — CLINDAMYCIN PHOSPHATE 10 MG/ML
1 SOLUTION TOPICAL
COMMUNITY
Start: 2024-03-02

## 2024-04-01 RX ORDER — MONTELUKAST SODIUM 10 MG/1
TABLET ORAL
Qty: 90 TABLET | Refills: 2 | Status: SHIPPED | OUTPATIENT
Start: 2024-04-01
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
## 2024-08-17 ENCOUNTER — HOSPITAL ENCOUNTER (EMERGENCY)
Facility: HOSPITAL | Age: 24
Discharge: HOME | End: 2024-08-18
Attending: INTERNAL MEDICINE
Payer: COMMERCIAL

## 2024-08-17 DIAGNOSIS — S16.1XXA CERVICAL STRAIN, ACUTE, INITIAL ENCOUNTER: ICD-10-CM
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
## 2024-08-17 PROCEDURE — 99284 EMERGENCY DEPT VISIT MOD MDM: CPT

## 2024-08-17 PROCEDURE — 81025 URINE PREGNANCY TEST: CPT | Performed by: INTERNAL MEDICINE

## 2024-08-17 NOTE — Clinical Note
"Ela "Ela" Natalee was seen and treated in our emergency department on 8/17/2024.  She may return to work on 08/19/2024.       If you have any questions or concerns, please don't hesitate to call.      Melina ALMODOVAR    "

## 2024-08-18 VITALS
SYSTOLIC BLOOD PRESSURE: 119 MMHG | HEART RATE: 80 BPM | RESPIRATION RATE: 18 BRPM | BODY MASS INDEX: 32.33 KG/M2 | DIASTOLIC BLOOD PRESSURE: 71 MMHG | OXYGEN SATURATION: 100 % | TEMPERATURE: 98 F | WEIGHT: 206 LBS | HEIGHT: 67 IN

## 2024-08-18 LAB — B-HCG UR QL: NEGATIVE

## 2024-08-18 PROCEDURE — 96372 THER/PROPH/DIAG INJ SC/IM: CPT | Performed by: INTERNAL MEDICINE

## 2024-08-18 PROCEDURE — 63600175 PHARM REV CODE 636 W HCPCS: Performed by: INTERNAL MEDICINE

## 2024-08-18 RX ORDER — KETOROLAC TROMETHAMINE 30 MG/ML
60 INJECTION, SOLUTION INTRAMUSCULAR; INTRAVENOUS
Status: COMPLETED | OUTPATIENT
Start: 2024-08-18 | End: 2024-08-18

## 2024-08-18 RX ORDER — IBUPROFEN 600 MG/1
600 TABLET ORAL EVERY 6 HOURS PRN
Qty: 20 TABLET | Refills: 0 | Status: SHIPPED | OUTPATIENT
Start: 2024-08-18

## 2024-08-18 RX ORDER — METHOCARBAMOL 500 MG/1
1000 TABLET, FILM COATED ORAL 3 TIMES DAILY
Qty: 42 TABLET | Refills: 0 | Status: SHIPPED | OUTPATIENT
Start: 2024-08-18 | End: 2024-08-25

## 2024-08-18 RX ADMIN — KETOROLAC TROMETHAMINE 60 MG: 30 INJECTION, SOLUTION INTRAMUSCULAR at 12:08
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
